# Patient Record
Sex: FEMALE | Race: WHITE
[De-identification: names, ages, dates, MRNs, and addresses within clinical notes are randomized per-mention and may not be internally consistent; named-entity substitution may affect disease eponyms.]

---

## 2019-11-20 ENCOUNTER — HOSPITAL ENCOUNTER (EMERGENCY)
Dept: HOSPITAL 4 - SED | Age: 29
Discharge: HOME | End: 2019-11-20
Payer: MEDICAID

## 2019-11-20 VITALS — WEIGHT: 168 LBS | HEIGHT: 66 IN | BODY MASS INDEX: 27 KG/M2

## 2019-11-20 VITALS — SYSTOLIC BLOOD PRESSURE: 114 MMHG

## 2019-11-20 DIAGNOSIS — R05: Primary | ICD-10-CM

## 2020-01-18 ENCOUNTER — HOSPITAL ENCOUNTER (EMERGENCY)
Dept: HOSPITAL 4 - SED | Age: 30
Discharge: HOME | End: 2020-01-18
Payer: MEDICAID

## 2020-01-18 VITALS — BODY MASS INDEX: 25.11 KG/M2 | WEIGHT: 160 LBS | HEIGHT: 67 IN

## 2020-01-18 VITALS — SYSTOLIC BLOOD PRESSURE: 126 MMHG
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
## 2020-01-18 DIAGNOSIS — S61.213A: Primary | ICD-10-CM
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
## 2020-01-18 NOTE — NUR
Patient given written and verbal discharge instructions and verbalizes 
understanding.  ER MD discussed with patient the results and treatment 
provided. Patient in stable condition. ID arm band removed. 

Patient educated on pain management and to follow up with PMD. Pain Scale .

Opportunity for questions provided and answered. Medication side effect fact 
sheet provided.